# Patient Record
Sex: MALE | Race: WHITE | NOT HISPANIC OR LATINO | Employment: FULL TIME | URBAN - METROPOLITAN AREA
[De-identification: names, ages, dates, MRNs, and addresses within clinical notes are randomized per-mention and may not be internally consistent; named-entity substitution may affect disease eponyms.]

---

## 2024-11-26 ENCOUNTER — APPOINTMENT (OUTPATIENT)
Dept: UROLOGY | Facility: CLINIC | Age: 34
End: 2024-11-26
Payer: COMMERCIAL

## 2024-11-26 DIAGNOSIS — N52.9 VASCULOGENIC ERECTILE DYSFUNCTION, UNSPECIFIED VASCULOGENIC ERECTILE DYSFUNCTION TYPE: Primary | ICD-10-CM

## 2024-11-26 PROCEDURE — 99203 OFFICE O/P NEW LOW 30 MIN: CPT | Performed by: NURSE PRACTITIONER

## 2024-11-26 NOTE — PROGRESS NOTES
[facility]  Outpatient Clinic Note    Subjective   Rico Diaz is a 34 y.o. male    History of Present Illness   Patient presenting to clinic today virtually NPV with complaint of vasculogenic ED post COVID. EMR reviewed, no other significant medical history. He lives in [place] and has followed urology at [facility]. Patient reports that he has   utilized Sildenafil, Tadalafi, and ICI which are no longer effective. He has no bothersome urinary symptoms.     Duplex Doppler study was performed, consisting of integrated two-dimensional (2D) real-time imaging: color flow Doppler and Doppler spectral analysis     PSV of 28.7 cm/sec Right and 77.3 cm/sec Left    EDV of 3.6 cm/sec Right and 0 cm/sec Left    RI: 0.87 right, 1.00 left       Past Medical History and Surgical History   No past medical history on file.  No past surgical history on file.    Medications  No current outpatient medications on file prior to visit.     No current facility-administered medications on file prior to visit.       Objective   Physicial Exam  General: Well developed, well nourished, alert and cooperative, appears in no acute distress  Eyes: Non-injected conjunctiva, sclera clear, no proptosis  Cardiac: Extremities are warm and well perfused. No edema, cyanosis or pallor.   Lungs: Breathing is easy, non-labored. Speaking in clear and complete sentences. Normal diaphragmatic movement.  MSK: Ambulatory with steady gait, unassisted  Neuro: alert and oriented to person, place and time  Psych: Demonstrates good judgement and reason, without hallucinations, abnormal affect or abnormal behaviors.  Skin: no obvious lesions, no rashes.    No results found for any previous visit.      Review of Systems  All other systems have been reviewed and are negative for complaint.      Assessment and Plan     -ED   Refractory to pde5i and ICI   Patient was seen today with the complaint of erectile dysfunction (ED). I  went over the definition of ED, which is the inability to obtain or maintain an erection sufficient for satisfactory sexual activity. I outlined that erectile function is a complex interplay of neural, vascular, hormonal and psychological factors. Disruption in any of these pathways may lead to ED.     Patient will follow up with Dr. Gutierrez to discuss further. Consider IPP     All questions and concerns were addressed. Patient verbalizes understanding and has no other questions at this time.     Jenny Ferguson-- SANDER LOZADA  Office Phone:  [phone]

## 2025-01-21 ENCOUNTER — APPOINTMENT (OUTPATIENT)
Dept: UROLOGY | Facility: CLINIC | Age: 35
End: 2025-01-21
Payer: COMMERCIAL

## 2025-01-21 DIAGNOSIS — N52.9 ERECTILE DYSFUNCTION, UNSPECIFIED ERECTILE DYSFUNCTION TYPE: Primary | ICD-10-CM

## 2025-01-21 PROCEDURE — 99214 OFFICE O/P EST MOD 30 MIN: CPT | Performed by: UROLOGY

## 2025-01-21 NOTE — PROGRESS NOTES
"Virtual or Telephone Consent    An interactive audio and video telecommunication system which permits real time communications between the patient (at the originating site) and provider (at the distant site) was utilized to provide this telehealth service.   Verbal consent was requested and obtained from Rico Diaz on this date, 01/21/25 for a telehealth visit.     HPI:  34 y.o. yo male patient complains of  erectile dysfunction  Per Jenny's notes: \"Patient presenting to clinic today virtually NPV with complaint of vasculogenic ED post COVID. EMR reviewed, no other significant medical history. He lives in [place] and has followed urology at [facility]. Patient reports that he has utilized Sildenafil, Tadalafil, and ICI which are no longer effective. He has no bothersome urinary symptoms. \"    Duplex Doppler study performed with Jenny: PSV of 28.7 cm/sec Right and 77.3 cm/sec Left EDV of 3.6 cm/sec Right and 0 cm/sec Left  RI: 0.87 right, 1.00 left     CTA results, all urology notes reviewed    s/p prostatectomy: Smoker? no  Tried PDE5is?:   Viagra/sildenafil - response: poor  Cialis/tadalafil- response: poor  Tried penile injections: yes, not effective  Libido/Desire: Good  Have been on Testosterone /anabolic steroids? Yes on androgel    No results found for: \"TESTOSTERONE\"  No results found for: \"LH\"  No results found for: \"FSH\"  No components found for: \"ESTRADIAL\"  No results found for: \"PSA\"  No components found for: \"CBC\"  No results found for: \"PROLACTIN\"  No results found for: \"HGBA1C\"      PMH:  No past medical history on file.     PSH:  No past surgical history on file.     Medications:  No current outpatient medications on file.    Allergy:  Not on File     Exam  CONSTITUTIONAL:        No acute distress    HEAD:        Normocephalic and atraumatic    CHEST / RESPIRATORY      no excess work of breathing, no respiratory distress,    ABDOMEN / GASTROINTESTINAL:        Abdomen " nondistended         Assessment/Plan  #Erectile Dysfunction failed pills and injections : I discussed the etiology and different treatment modalities for erectile dysfunction. Specifically, we talked about lifestyle factors like smoking, diet, and exercise. We also discussed ED as a sign of systemic disease, and the contributions of elevated cholesterol and elevated blood sugars on erections. We then discussed treatment modalities, specifically PDE5 inhibitors, intracavernosal injections, vacuum erectile devices, and penile prostheses.    - will refer him to Dr Gamez for angiogram , discussed  - if stenting not an option, would consider penile prosthesis    Scribe Attestation  By signing my name below, I, Alma Delia Padilla , Scribe   attest that this documentation has been prepared under the direction and in the presence of Saundra Gutierrez MD.

## 2025-03-06 ENCOUNTER — TELEPHONE (OUTPATIENT)
Dept: CARDIOLOGY | Facility: CLINIC | Age: 35
End: 2025-03-06
Payer: COMMERCIAL

## 2025-03-06 NOTE — TELEPHONE ENCOUNTER
Patient was contacted in regards to his upcoming appointment. We discussed the results of Penile Duplex suggestive of: PSV Rt 28.7 / Lt 77.3, EDV 3.6/0, RI 0.87/1.00  Based on those results patient has sufficient blood flow without significant evidence of arterial occlusion, no penile angiogram is indicated at this time. Patient remains scheduled for a clinic visit, at this time he would like to keep his appointment and discussed his options at the clinic visit.       Roseanna Garcia, KIERRA-CNP

## 2025-03-26 NOTE — PROGRESS NOTES
Referred by Dr. Gutierrez for erectile dysfunction       History of Present Illness  Rico Diaz is a 34 y.o. year old male patient with PMH of longstanding erectile dysfunction being referred by their Urologist for ED, symptoms first began around 2022 after a Covid infection.    He has an additional PMH of hypogonadism, currently on Clomid. Previously trialed For his ED he has trialed Sildenafil, tadalafil, and ICI without satisfactory results.   He presented today to our office for evaluation of vasculogenic ED.   He denies any trauma, he is not involved in sports, he denies similar symptoms and is interested in angiographic peripheral options.  Past Medical History  No past medical history on file.    Past Surgical History  No past surgical history on file.    Social History  Social History     Tobacco Use    Smoking status: Not on file    Smokeless tobacco: Not on file   Substance Use Topics    Alcohol use: Not on file    Drug use: Not on file       Family History   No family history on file.    Review of Systems  As per HPI, all other systems reviewed and negative.    Allergies:  Not on File     Outpatient Medications:  No current outpatient medications      Vitals:  There were no vitals filed for this visit.    Physical Exam:  Physical Exam   General: awake, alert and oriented. No acute distress.   Skin: Skin is warm, dry and intact without rashes or lesions.  Musculoskeletal: moves all extremities  Neurological: no focal deficits; gait steady  Psychiatric: appropriate mood and affect; good judgment and insight   Reviewed Study(s):     Penile Doppler  RIGHT:   PSV 28.8  EDV 3.54  RI 0.877  LEFT  PSV 77.2  EDV 0.00  RI 1.00    Assessment/Plan   Rico Diaz is a 34 y.o. male who presents today for evaluation of his erectile dysfunction, referred by his Urologist Dr. Gutierrez.    ED  - On review of the above penile doppler study, Mr. Diaz does not have evidence of arterial insufficiency. The  PSV > 30 on LEFT and RIGHT 28.8. EDV > 5 thus no evidence of venous leak. RI bilaterally > 0.80, again without evidence of arterial insufficiency or venous dysfunction.   Patient was informed that he has adequate blood flow and that we don't recommend any interventions. Patient very anxious and reports not having other options to trail and would like to proceed with diagnostic angiogram.    Discussed: The risks of arterial access, including vessel or nerve injury, bleeding, infection, limb ischemia, embolization, reaction to medication, or need for operation, were discussed. The risks of contrast administration, including renal failure or allergic type reaction, were discussed. We also discussed the additional risks of intervention, including vessel injury, infection, residual or recurrent ischemia, need for operation or additional intervention, failure to resolve ischemia, limb loss, or other serious complications. The patient indicated understanding of the procedure, plan, alternatives, and risks, and voiced consent to proceed.      Plan:  -Continue aggressive risk factor modifications with diet, exercise   -angioplasty procedure on May 14th, 2025  at [facility] ([address] [place] [address]) with Dr Gamez  -pre procedure instructions discussed and provided t/r My Chart per patient request    Matthew Gamez DO

## 2025-04-08 ENCOUNTER — APPOINTMENT (OUTPATIENT)
Dept: CARDIOLOGY | Facility: CLINIC | Age: 35
End: 2025-04-08
Payer: COMMERCIAL

## 2025-04-08 VITALS
HEIGHT: 74 IN | HEART RATE: 69 BPM | SYSTOLIC BLOOD PRESSURE: 137 MMHG | DIASTOLIC BLOOD PRESSURE: 64 MMHG | BODY MASS INDEX: 25.15 KG/M2 | WEIGHT: 196 LBS

## 2025-04-08 DIAGNOSIS — N52.9 ED (ERECTILE DYSFUNCTION): ICD-10-CM

## 2025-04-08 DIAGNOSIS — Z01.818 PREOP TESTING: Primary | ICD-10-CM

## 2025-04-08 PROCEDURE — 3008F BODY MASS INDEX DOCD: CPT | Performed by: INTERNAL MEDICINE

## 2025-04-08 PROCEDURE — 99205 OFFICE O/P NEW HI 60 MIN: CPT | Performed by: INTERNAL MEDICINE

## 2025-04-08 RX ORDER — LOSARTAN POTASSIUM 50 MG/1
50 TABLET ORAL DAILY
COMMUNITY

## 2025-04-08 RX ORDER — TRIAMTERENE AND HYDROCHLOROTHIAZIDE 75; 50 MG/1; MG/1
1 TABLET ORAL DAILY
COMMUNITY

## 2025-04-08 RX ORDER — LOSARTAN POTASSIUM 50 MG/1
1 TABLET ORAL DAILY
COMMUNITY
Start: 2024-11-05

## 2025-04-08 ASSESSMENT — PAIN SCALES - GENERAL: PAINLEVEL_OUTOF10: 0-NO PAIN

## 2025-04-08 NOTE — PATIENT INSTRUCTIONS
Thank you for coming to see us in the [facility] today.     - We have reviewed the results of the test, the blood flow to the right artery 28.7, Lt 77.3. Thse   - Please continue taking all your medications, walking/exercise.   -we dont recommend an intervention, we want you to know that those numbers are very close to normal. If you would like us to do an diagnostic angiogram we can schedule it.  - Continue aggressive risk factor modifications with diet, exercise (walking to improve collateral blood flow)   We are going to schedule an angioplasty procedure on May 14th, 2025  at [facility] ([address] [facility], [address]) with Dr Gamez  The cath lab staff will call you the day before the procedure for further instructions and time of the procedure.   The phone number to reach them at is [phone] (M-F, 6:30 am -5 pm)   You will need to have  blood work done prior to the procedure. Please have blood drawn at any  location today or within a week of the procedure.  Plan to stay overnight after the procedure to be discharged the next day. But there is a chance you will be discharged home the same day.   You will need to have a ride to and from the hospital.   Please bring an updated list of all your medications or all the medication on the day of the procedure for review.  Please do not eat or drink anything after midnight before the procedure.   You can take the rest of your medications as prescribed in the morning of the procedure  with a sip of water.   Please call our office with any questions at [phone], press option 1

## 2025-04-29 ENCOUNTER — TELEPHONE (OUTPATIENT)
Dept: CARDIOLOGY | Facility: HOSPITAL | Age: 35
End: 2025-04-29
Payer: COMMERCIAL

## 2025-04-29 NOTE — TELEPHONE ENCOUNTER
Patient rescheduled for his angiogram from the May 14th to May 21st. Patient aware and agreeable.       Roseanna Garcia, KIERRA-CNP   Endovascular/Limb [facility] Service   Days: [id number]/Haiku: [facility] [place] Team  N[facility]p-[facility]a [id number]

## 2025-05-15 LAB
ALBUMIN SERPL-MCNC: 4.4 G/DL (ref 3.6–5.1)
BUN SERPL-MCNC: 16 MG/DL (ref 7–25)
BUN/CREAT SERPL: NORMAL (CALC) (ref 6–22)
CALCIUM SERPL-MCNC: 9.5 MG/DL (ref 8.6–10.3)
CHLORIDE SERPL-SCNC: 105 MMOL/L (ref 98–110)
CO2 SERPL-SCNC: 27 MMOL/L (ref 20–32)
CREAT SERPL-MCNC: 1.23 MG/DL (ref 0.6–1.26)
EGFRCR SERPLBLD CKD-EPI 2021: 79 ML/MIN/1.73M2
ERYTHROCYTE [DISTWIDTH] IN BLOOD BY AUTOMATED COUNT: 12.5 % (ref 11–15)
GLUCOSE SERPL-MCNC: 97 MG/DL (ref 65–99)
HCT VFR BLD AUTO: 46.1 % (ref 38.5–50)
HGB BLD-MCNC: 14.7 G/DL (ref 13.2–17.1)
MCH RBC QN AUTO: 29.2 PG (ref 27–33)
MCHC RBC AUTO-ENTMCNC: 31.9 G/DL (ref 32–36)
MCV RBC AUTO: 91.5 FL (ref 80–100)
PHOSPHATE SERPL-MCNC: 3.2 MG/DL (ref 2.5–4.5)
PLATELET # BLD AUTO: 200 THOUSAND/UL (ref 140–400)
PMV BLD REES-ECKER: 10.9 FL (ref 7.5–12.5)
POTASSIUM SERPL-SCNC: 4.4 MMOL/L (ref 3.5–5.3)
RBC # BLD AUTO: 5.04 MILLION/UL (ref 4.2–5.8)
SODIUM SERPL-SCNC: 139 MMOL/L (ref 135–146)
WBC # BLD AUTO: 7.2 THOUSAND/UL (ref 3.8–10.8)

## 2025-05-21 ENCOUNTER — HOSPITAL ENCOUNTER (OUTPATIENT)
Facility: HOSPITAL | Age: 35
Discharge: HOME | End: 2025-05-22
Attending: INTERNAL MEDICINE | Admitting: INTERNAL MEDICINE
Payer: COMMERCIAL

## 2025-05-21 DIAGNOSIS — N52.9 ED (ERECTILE DYSFUNCTION): ICD-10-CM

## 2025-05-21 DIAGNOSIS — I70.8 ATHEROSCLEROSIS OF OTHER ARTERIES: ICD-10-CM

## 2025-05-21 LAB
ACT BLD: 137 SEC (ref 83–199)
ACT BLD: 260 SEC (ref 83–199)

## 2025-05-21 PROCEDURE — 85347 COAGULATION TIME ACTIVATED: CPT | Performed by: INTERNAL MEDICINE

## 2025-05-21 PROCEDURE — 75736 ARTERY X-RAYS PELVIS: CPT | Performed by: INTERNAL MEDICINE

## 2025-05-21 PROCEDURE — 96373 THER/PROPH/DIAG INJ IA: CPT | Performed by: INTERNAL MEDICINE

## 2025-05-21 PROCEDURE — C1769 GUIDE WIRE: HCPCS | Performed by: INTERNAL MEDICINE

## 2025-05-21 PROCEDURE — 7100000011 HC EXTENDED STAY RECOVERY HOURLY - NURSING UNIT

## 2025-05-21 PROCEDURE — 75774 ARTERY X-RAY EACH VESSEL: CPT | Performed by: INTERNAL MEDICINE

## 2025-05-21 PROCEDURE — 76937 US GUIDE VASCULAR ACCESS: CPT | Performed by: INTERNAL MEDICINE

## 2025-05-21 PROCEDURE — 99152 MOD SED SAME PHYS/QHP 5/>YRS: CPT | Performed by: INTERNAL MEDICINE

## 2025-05-21 PROCEDURE — 2500000004 HC RX 250 GENERAL PHARMACY W/ HCPCS (ALT 636 FOR OP/ED): Mod: JZ | Performed by: INTERNAL MEDICINE

## 2025-05-21 PROCEDURE — 36246 INS CATH ABD/L-EXT ART 2ND: CPT

## 2025-05-21 PROCEDURE — 85347 COAGULATION TIME ACTIVATED: CPT

## 2025-05-21 PROCEDURE — 7100000010 HC PHASE TWO TIME - EACH INCREMENTAL 1 MINUTE: Performed by: INTERNAL MEDICINE

## 2025-05-21 PROCEDURE — C1887 CATHETER, GUIDING: HCPCS | Performed by: INTERNAL MEDICINE

## 2025-05-21 PROCEDURE — 7100000009 HC PHASE TWO TIME - INITIAL BASE CHARGE: Performed by: INTERNAL MEDICINE

## 2025-05-21 PROCEDURE — 99221 1ST HOSP IP/OBS SF/LOW 40: CPT

## 2025-05-21 PROCEDURE — C1894 INTRO/SHEATH, NON-LASER: HCPCS | Performed by: INTERNAL MEDICINE

## 2025-05-21 PROCEDURE — 2550000001 HC RX 255 CONTRASTS: Mod: JW | Performed by: INTERNAL MEDICINE

## 2025-05-21 PROCEDURE — 2720000007 HC OR 272 NO HCPCS: Performed by: INTERNAL MEDICINE

## 2025-05-21 PROCEDURE — 99153 MOD SED SAME PHYS/QHP EA: CPT | Performed by: INTERNAL MEDICINE

## 2025-05-21 RX ORDER — LIDOCAINE HYDROCHLORIDE 10 MG/ML
INJECTION, SOLUTION EPIDURAL; INFILTRATION; INTRACAUDAL; PERINEURAL AS NEEDED
Status: DISCONTINUED | OUTPATIENT
Start: 2025-05-21 | End: 2025-05-21 | Stop reason: HOSPADM

## 2025-05-21 RX ORDER — LOSARTAN POTASSIUM 50 MG/1
50 TABLET ORAL DAILY
Status: DISCONTINUED | OUTPATIENT
Start: 2025-05-22 | End: 2025-05-22 | Stop reason: HOSPADM

## 2025-05-21 RX ORDER — MIDAZOLAM HYDROCHLORIDE 1 MG/ML
INJECTION, SOLUTION INTRAMUSCULAR; INTRAVENOUS AS NEEDED
Status: DISCONTINUED | OUTPATIENT
Start: 2025-05-21 | End: 2025-05-21 | Stop reason: HOSPADM

## 2025-05-21 RX ORDER — IODIXANOL 320 MG/ML
INJECTION, SOLUTION INTRAVASCULAR AS NEEDED
Status: DISCONTINUED | OUTPATIENT
Start: 2025-05-21 | End: 2025-05-21 | Stop reason: HOSPADM

## 2025-05-21 RX ORDER — HEPARIN SODIUM 1000 [USP'U]/ML
INJECTION, SOLUTION INTRAVENOUS; SUBCUTANEOUS AS NEEDED
Status: DISCONTINUED | OUTPATIENT
Start: 2025-05-21 | End: 2025-05-21 | Stop reason: HOSPADM

## 2025-05-21 RX ORDER — HEPARIN SODIUM 5000 [USP'U]/ML
5000 INJECTION, SOLUTION INTRAVENOUS; SUBCUTANEOUS EVERY 12 HOURS
Status: DISCONTINUED | OUTPATIENT
Start: 2025-05-22 | End: 2025-05-22 | Stop reason: HOSPADM

## 2025-05-21 RX ORDER — PROTAMINE SULFATE 10 MG/ML
INJECTION, SOLUTION INTRAVENOUS CONTINUOUS PRN
Status: COMPLETED | OUTPATIENT
Start: 2025-05-21 | End: 2025-05-21

## 2025-05-21 RX ORDER — ACETAMINOPHEN 325 MG/1
650 TABLET ORAL EVERY 4 HOURS PRN
Status: DISCONTINUED | OUTPATIENT
Start: 2025-05-21 | End: 2025-05-22 | Stop reason: HOSPADM

## 2025-05-21 RX ORDER — FENTANYL CITRATE 50 UG/ML
INJECTION, SOLUTION INTRAMUSCULAR; INTRAVENOUS AS NEEDED
Status: DISCONTINUED | OUTPATIENT
Start: 2025-05-21 | End: 2025-05-21 | Stop reason: HOSPADM

## 2025-05-21 RX ORDER — NITROGLYCERIN 40 MG/100ML
INJECTION INTRAVENOUS AS NEEDED
Status: DISCONTINUED | OUTPATIENT
Start: 2025-05-21 | End: 2025-05-21 | Stop reason: HOSPADM

## 2025-05-21 SDOH — SOCIAL STABILITY: SOCIAL INSECURITY: DO YOU FEEL UNSAFE GOING BACK TO THE PLACE WHERE YOU ARE LIVING?: NO

## 2025-05-21 SDOH — SOCIAL STABILITY: SOCIAL INSECURITY: WERE YOU ABLE TO COMPLETE ALL THE BEHAVIORAL HEALTH SCREENINGS?: YES

## 2025-05-21 SDOH — SOCIAL STABILITY: SOCIAL INSECURITY
WITHIN THE LAST YEAR, HAVE YOU BEEN RAPED OR FORCED TO HAVE ANY KIND OF SEXUAL ACTIVITY BY YOUR PARTNER OR EX-PARTNER?: NO

## 2025-05-21 SDOH — SOCIAL STABILITY: SOCIAL INSECURITY
WITHIN THE LAST YEAR, HAVE YOU BEEN KICKED, HIT, SLAPPED, OR OTHERWISE PHYSICALLY HURT BY YOUR PARTNER OR EX-PARTNER?: NO

## 2025-05-21 SDOH — ECONOMIC STABILITY: FOOD INSECURITY: WITHIN THE PAST 12 MONTHS, THE FOOD YOU BOUGHT JUST DIDN'T LAST AND YOU DIDN'T HAVE MONEY TO GET MORE.: NEVER TRUE

## 2025-05-21 SDOH — SOCIAL STABILITY: SOCIAL INSECURITY: WITHIN THE LAST YEAR, HAVE YOU BEEN AFRAID OF YOUR PARTNER OR EX-PARTNER?: NO

## 2025-05-21 SDOH — ECONOMIC STABILITY: INCOME INSECURITY: IN THE PAST 12 MONTHS HAS THE ELECTRIC, GAS, OIL, OR WATER COMPANY THREATENED TO SHUT OFF SERVICES IN YOUR HOME?: NO

## 2025-05-21 SDOH — SOCIAL STABILITY: SOCIAL INSECURITY: WITHIN THE LAST YEAR, HAVE YOU BEEN HUMILIATED OR EMOTIONALLY ABUSED IN OTHER WAYS BY YOUR PARTNER OR EX-PARTNER?: NO

## 2025-05-21 SDOH — ECONOMIC STABILITY: HOUSING INSECURITY: AT ANY TIME IN THE PAST 12 MONTHS, WERE YOU HOMELESS OR LIVING IN A SHELTER (INCLUDING NOW)?: NO

## 2025-05-21 SDOH — HEALTH STABILITY: MENTAL HEALTH: HOW OFTEN DO YOU HAVE A DRINK CONTAINING ALCOHOL?: NEVER

## 2025-05-21 SDOH — ECONOMIC STABILITY: FOOD INSECURITY: HOW HARD IS IT FOR YOU TO PAY FOR THE VERY BASICS LIKE FOOD, HOUSING, MEDICAL CARE, AND HEATING?: NOT HARD AT ALL

## 2025-05-21 SDOH — HEALTH STABILITY: MENTAL HEALTH: HOW MANY DRINKS CONTAINING ALCOHOL DO YOU HAVE ON A TYPICAL DAY WHEN YOU ARE DRINKING?: PATIENT DOES NOT DRINK

## 2025-05-21 SDOH — ECONOMIC STABILITY: HOUSING INSECURITY: IN THE LAST 12 MONTHS, WAS THERE A TIME WHEN YOU WERE NOT ABLE TO PAY THE MORTGAGE OR RENT ON TIME?: NO

## 2025-05-21 SDOH — ECONOMIC STABILITY: FOOD INSECURITY: WITHIN THE PAST 12 MONTHS, YOU WORRIED THAT YOUR FOOD WOULD RUN OUT BEFORE YOU GOT THE MONEY TO BUY MORE.: NEVER TRUE

## 2025-05-21 SDOH — SOCIAL STABILITY: SOCIAL INSECURITY: HAVE YOU HAD THOUGHTS OF HARMING ANYONE ELSE?: NO

## 2025-05-21 SDOH — SOCIAL STABILITY: SOCIAL INSECURITY: HAVE YOU HAD ANY THOUGHTS OF HARMING ANYONE ELSE?: NO

## 2025-05-21 SDOH — HEALTH STABILITY: MENTAL HEALTH: HOW OFTEN DO YOU HAVE SIX OR MORE DRINKS ON ONE OCCASION?: NEVER

## 2025-05-21 SDOH — ECONOMIC STABILITY: TRANSPORTATION INSECURITY: IN THE PAST 12 MONTHS, HAS LACK OF TRANSPORTATION KEPT YOU FROM MEDICAL APPOINTMENTS OR FROM GETTING MEDICATIONS?: NO

## 2025-05-21 SDOH — ECONOMIC STABILITY: HOUSING INSECURITY: IN THE PAST 12 MONTHS, HOW MANY TIMES HAVE YOU MOVED WHERE YOU WERE LIVING?: 1

## 2025-05-21 SDOH — SOCIAL STABILITY: SOCIAL INSECURITY: ARE YOU OR HAVE YOU BEEN THREATENED OR ABUSED PHYSICALLY, EMOTIONALLY, OR SEXUALLY BY ANYONE?: NO

## 2025-05-21 SDOH — SOCIAL STABILITY: SOCIAL INSECURITY: ARE THERE ANY APPARENT SIGNS OF INJURIES/BEHAVIORS THAT COULD BE RELATED TO ABUSE/NEGLECT?: NO

## 2025-05-21 SDOH — SOCIAL STABILITY: SOCIAL INSECURITY: HAS ANYONE EVER THREATENED TO HURT YOUR FAMILY OR YOUR PETS?: NO

## 2025-05-21 SDOH — SOCIAL STABILITY: SOCIAL INSECURITY: ABUSE: ADULT

## 2025-05-21 SDOH — SOCIAL STABILITY: SOCIAL INSECURITY: DOES ANYONE TRY TO KEEP YOU FROM HAVING/CONTACTING OTHER FRIENDS OR DOING THINGS OUTSIDE YOUR HOME?: NO

## 2025-05-21 SDOH — SOCIAL STABILITY: SOCIAL INSECURITY: DO YOU FEEL ANYONE HAS EXPLOITED OR TAKEN ADVANTAGE OF YOU FINANCIALLY OR OF YOUR PERSONAL PROPERTY?: NO

## 2025-05-21 ASSESSMENT — COGNITIVE AND FUNCTIONAL STATUS - GENERAL
MOBILITY SCORE: 24
MOBILITY SCORE: 24
DAILY ACTIVITIY SCORE: 24
MOBILITY SCORE: 24
PATIENT BASELINE BEDBOUND: NO

## 2025-05-21 ASSESSMENT — LIFESTYLE VARIABLES
HOW OFTEN DO YOU HAVE 6 OR MORE DRINKS ON ONE OCCASION: NEVER
AUDIT-C TOTAL SCORE: 0
AUDIT-C TOTAL SCORE: 0
HOW OFTEN DO YOU HAVE A DRINK CONTAINING ALCOHOL: NEVER
HOW MANY STANDARD DRINKS CONTAINING ALCOHOL DO YOU HAVE ON A TYPICAL DAY: PATIENT DOES NOT DRINK
SKIP TO QUESTIONS 9-10: 1
AUDIT-C TOTAL SCORE: 0
SKIP TO QUESTIONS 9-10: 1

## 2025-05-21 ASSESSMENT — COLUMBIA-SUICIDE SEVERITY RATING SCALE - C-SSRS
6. HAVE YOU EVER DONE ANYTHING, STARTED TO DO ANYTHING, OR PREPARED TO DO ANYTHING TO END YOUR LIFE?: NO
2. HAVE YOU ACTUALLY HAD ANY THOUGHTS OF KILLING YOURSELF?: NO
1. IN THE PAST MONTH, HAVE YOU WISHED YOU WERE DEAD OR WISHED YOU COULD GO TO SLEEP AND NOT WAKE UP?: NO

## 2025-05-21 ASSESSMENT — ACTIVITIES OF DAILY LIVING (ADL)
HEARING - RIGHT EAR: FUNCTIONAL
FEEDING YOURSELF: INDEPENDENT
WALKS IN HOME: INDEPENDENT
JUDGMENT_ADEQUATE_SAFELY_COMPLETE_DAILY_ACTIVITIES: YES
LACK_OF_TRANSPORTATION: NO
PATIENT'S MEMORY ADEQUATE TO SAFELY COMPLETE DAILY ACTIVITIES?: YES
ADEQUATE_TO_COMPLETE_ADL: YES
TOILETING: INDEPENDENT
GROOMING: INDEPENDENT
HEARING - LEFT EAR: FUNCTIONAL
BATHING: INDEPENDENT
DRESSING YOURSELF: INDEPENDENT

## 2025-05-21 ASSESSMENT — PATIENT HEALTH QUESTIONNAIRE - PHQ9
2. FEELING DOWN, DEPRESSED OR HOPELESS: NOT AT ALL
SUM OF ALL RESPONSES TO PHQ9 QUESTIONS 1 & 2: 0
1. LITTLE INTEREST OR PLEASURE IN DOING THINGS: NOT AT ALL

## 2025-05-21 ASSESSMENT — PAIN SCALES - GENERAL
PAINLEVEL_OUTOF10: 0 - NO PAIN
PAINLEVEL_OUTOF10: 0 - NO PAIN

## 2025-05-21 ASSESSMENT — PAIN - FUNCTIONAL ASSESSMENT: PAIN_FUNCTIONAL_ASSESSMENT: 0-10

## 2025-05-21 NOTE — CARE PLAN
The patient's goals for the shift include      The clinical goals for the shift include Patient will have no numbness and tingling before discharge

## 2025-05-21 NOTE — H&P
"History Of Present Illness:    Rico Diaz is a 35 y.o. male presenting with erectile dysfunction.    Rico Diaz has a PMH of longstanding erectile dysfunction being referred by their Urologist for ED, symptoms first began around 2022 after a Covid infection.     He has an additional PMH of hypogonadism, currently on Clomid. Previously trialed For his ED he has trialed Sildenafil, tadalafil, and ICI without satisfactory results.     He underwent pudendal artery angiogram without intervention accessed via R CFA 5 Fr sheath manually removed at approximately 3pm. Procedure with Dr. Gamez on 5/21    PLAN:  - Admit to inpatient overnight  - Maintain bedrest & neurovascular checks as ordered  - Plan to DC 5/22 AM    Last Recorded Vitals:  Vitals:    05/21/25 1006   Weight: 86.2 kg (190 lb)   Height: 1.88 m (6' 2\")       Last Labs:  CBC - 5/14/2025:  8:44 AM  7.2 14.7 200    46.1      CMP - 5/14/2025:  8:44 AM  9.5 _ _ --- _   3.2 4.4 _ _      PTT - No results in last year.  _   _ _     No results found for: \"TROPHS\", \"BNP\", \"HGBA1C\", \"LDLCALC\", \"VLDL\"   Last I/O:  No intake/output data recorded.    Past Cardiology Tests (Last 3 Years):  EKG:  No results found for this or any previous visit from the past 1095 days.    Echo:  No results found for this or any previous visit from the past 1095 days.    Ejection Fractions:  No results found for: \"EF\"  Cath:  No results found for this or any previous visit from the past 1095 days.    Stress Test:  No results found for this or any previous visit from the past 1095 days.    Cardiac Imaging:  No results found for this or any previous visit from the past 1095 days.      Past Medical History:  He has no past medical history on file.    Past Surgical History:  He has no past surgical history on file.      Social History:  He reports that he has never smoked. He has never used smokeless tobacco. He reports that he does not currently use alcohol. He reports that he does not " currently use drugs.    Family History:  Family History[1]     Allergies:  Penicillins    Inpatient Medications:  Scheduled Medications[2]  PRN Medications[3]  Continuous Medications[4]  Outpatient Medications:  Current Outpatient Medications   Medication Instructions    losartan (COZAAR) 50 mg, oral, Daily       Physical Exam:  Physical Exam  Constitutional:       General: He is not in acute distress.     Appearance: Normal appearance. He is not ill-appearing.   HENT:      Head: Normocephalic.      Mouth/Throat:      Mouth: Mucous membranes are moist.      Pharynx: Oropharynx is clear.   Eyes:      Extraocular Movements: Extraocular movements intact.      Conjunctiva/sclera: Conjunctivae normal.      Pupils: Pupils are equal, round, and reactive to light.   Cardiovascular:      Rate and Rhythm: Normal rate and regular rhythm.      Pulses: Normal pulses.      Heart sounds: Normal heart sounds.   Pulmonary:      Effort: Pulmonary effort is normal.      Breath sounds: Normal breath sounds.   Abdominal:      General: Abdomen is flat. Bowel sounds are normal.      Palpations: Abdomen is soft.   Musculoskeletal:         General: Normal range of motion.      Cervical back: Normal range of motion and neck supple.      Right lower leg: No edema.      Left lower leg: No edema.      Comments: R groin sheath removed, RN @ bedside holding pressure   Skin:     General: Skin is warm and dry.      Capillary Refill: Capillary refill takes less than 2 seconds.   Neurological:      General: No focal deficit present.      Mental Status: He is alert and oriented to person, place, and time. Mental status is at baseline.   Psychiatric:         Mood and Affect: Mood normal.         Behavior: Behavior normal.         Thought Content: Thought content normal.         Judgment: Judgment normal.            Assessment/Plan   Neuro/Psych  - No acute issues  - Insomnia/Delirium Prevention: Encourage normal sleep/wake cycle. Encourage lights on,  stimulus, activity during day and dark, relaxed environment at night while minimizing interruptions. Frequent reorientation to person, place, and time. Encourage familiar people and objects at bedside  - Pain: PRN Tylenol    Cardiac & Vascular  - s/p pudendal artery angiogram without intervention accessed via R CFA 5 Fr sheath manually removed at approximately 3pm. Procedure with Dr. Gamez  - Bedrest & neurovascular checks as ordered  - Continue home Losartan    Respiratory  - No acute issues  - Maintain saturations > 94%. Encourage incentive spirometer, coughing, deep breathing, and ambulation    GI:  - No acute issues  - Regular Diet    Renal  - No acute issues  - Goal I/O even  - Renally dose all medications and avoid nephrotoxins as able    ID   - No acute issues    Heme  - Anticoagulation: N/A  - Monitor for signs of bleeding (coffee emesis, black stool)  - Goal HgB > 7.0, transfuse as needed    Endo  - No acute issues    Miscellaneous  - Discharge Planning: Plan to DC 5/22 AM  - DVT Prophylaxis: Heparin   - GI Prophylaxis: N/A  - Follow-up visit: TBD    Code Status:  Full Code    I spent 30 minutes in the professional and overall care of this patient    Nydia Guzman, APRN-CNP         [1] No family history on file.  [2]   Scheduled medications   Medication Dose Route Frequency    [START ON 5/22/2025] heparin (porcine)  5,000 Units subcutaneous q12h    [START ON 5/22/2025] losartan  50 mg oral Daily   [3]   PRN medications   Medication    acetaminophen   [4]   Continuous Medications   Medication Dose Last Rate

## 2025-05-21 NOTE — PROGRESS NOTES
Pharmacy Medication History Review    Rico Diaz is a 35 y.o. male admitted for ED (erectile dysfunction). Pharmacy reviewed the patient's [id number] medications and allergies for accuracy.    Medications ADDED  N/A  Medications CHANGED  N/A  Medications REMOVED/NOT TAKING   N/A     The list below reflects the updated PTA list.   Prior to Admission Medications   Prescriptions Last Dose Informant   losartan (Cozaar) 50 mg tablet 5/21/2025 Self   Sig: Take 1 tablet (50 mg) by mouth once daily.      Facility-Administered Medications: None       The list below reflects the updated allergy list. Please review each documented allergy for additional clarification and justification.  Allergies  Reviewed by Nydia Guzman, APRN-CNP on 5/21/2025        Severity Reactions Comments    Penicillins Low Rash             Patient accepts M2B at discharge. Pharmacy has been updated to [facility].    Sources  [facility]  Pharmacy dispense history  Patient Interview Good historian     Additional Comments  N/A    Khanh Espitia, Nano  [facility]  [address]  [facility], [address]  [place] [address]  Phone: [phone]  Please reach out via Secure Chat for questions, or if no response call [employer] or [employer]

## 2025-05-22 VITALS
RESPIRATION RATE: 18 BRPM | SYSTOLIC BLOOD PRESSURE: 110 MMHG | HEART RATE: 49 BPM | TEMPERATURE: 97.3 F | BODY MASS INDEX: 24.38 KG/M2 | HEIGHT: 74 IN | OXYGEN SATURATION: 97 % | DIASTOLIC BLOOD PRESSURE: 66 MMHG | WEIGHT: 190 LBS

## 2025-05-22 PROBLEM — N52.9 ED (ERECTILE DYSFUNCTION): Status: RESOLVED | Noted: 2025-04-08 | Resolved: 2025-05-22

## 2025-05-22 LAB
ALBUMIN SERPL BCP-MCNC: 4.5 G/DL (ref 3.4–5)
ALP SERPL-CCNC: 88 U/L (ref 33–120)
ALT SERPL W P-5'-P-CCNC: 15 U/L (ref 10–52)
ANION GAP SERPL CALC-SCNC: 11 MMOL/L (ref 10–20)
AST SERPL W P-5'-P-CCNC: 18 U/L (ref 9–39)
BILIRUB SERPL-MCNC: 0.7 MG/DL (ref 0–1.2)
BUN SERPL-MCNC: 14 MG/DL (ref 6–23)
CALCIUM SERPL-MCNC: 9.4 MG/DL (ref 8.6–10.6)
CHLORIDE SERPL-SCNC: 107 MMOL/L (ref 98–107)
CO2 SERPL-SCNC: 25 MMOL/L (ref 21–32)
CREAT SERPL-MCNC: 1.05 MG/DL (ref 0.5–1.3)
EGFRCR SERPLBLD CKD-EPI 2021: >90 ML/MIN/1.73M*2
ERYTHROCYTE [DISTWIDTH] IN BLOOD BY AUTOMATED COUNT: 11.9 % (ref 11.5–14.5)
GLUCOSE SERPL-MCNC: 97 MG/DL (ref 74–99)
HCT VFR BLD AUTO: 45 % (ref 41–52)
HGB BLD-MCNC: 14.9 G/DL (ref 13.5–17.5)
MCH RBC QN AUTO: 29.3 PG (ref 26–34)
MCHC RBC AUTO-ENTMCNC: 33.1 G/DL (ref 32–36)
MCV RBC AUTO: 88 FL (ref 80–100)
NRBC BLD-RTO: 0 /100 WBCS (ref 0–0)
PLATELET # BLD AUTO: 207 X10*3/UL (ref 150–450)
POTASSIUM SERPL-SCNC: 4.1 MMOL/L (ref 3.5–5.3)
PROT SERPL-MCNC: 7.2 G/DL (ref 6.4–8.2)
RBC # BLD AUTO: 5.09 X10*6/UL (ref 4.5–5.9)
SODIUM SERPL-SCNC: 139 MMOL/L (ref 136–145)
WBC # BLD AUTO: 8.8 X10*3/UL (ref 4.4–11.3)

## 2025-05-22 PROCEDURE — 36415 COLL VENOUS BLD VENIPUNCTURE: CPT

## 2025-05-22 PROCEDURE — 7100000011 HC EXTENDED STAY RECOVERY HOURLY - NURSING UNIT

## 2025-05-22 PROCEDURE — 96372 THER/PROPH/DIAG INJ SC/IM: CPT

## 2025-05-22 PROCEDURE — 82565 ASSAY OF CREATININE: CPT

## 2025-05-22 PROCEDURE — 99238 HOSP IP/OBS DSCHRG MGMT 30/<: CPT

## 2025-05-22 PROCEDURE — 2500000004 HC RX 250 GENERAL PHARMACY W/ HCPCS (ALT 636 FOR OP/ED)

## 2025-05-22 PROCEDURE — 2500000001 HC RX 250 WO HCPCS SELF ADMINISTERED DRUGS (ALT 637 FOR MEDICARE OP)

## 2025-05-22 PROCEDURE — 85027 COMPLETE CBC AUTOMATED: CPT

## 2025-05-22 RX ADMIN — HEPARIN SODIUM 5000 UNITS: 5000 INJECTION, SOLUTION INTRAVENOUS; SUBCUTANEOUS at 08:16

## 2025-05-22 RX ADMIN — LOSARTAN POTASSIUM 50 MG: 50 TABLET, FILM COATED ORAL at 08:16

## 2025-05-22 ASSESSMENT — COGNITIVE AND FUNCTIONAL STATUS - GENERAL
MOBILITY SCORE: 24
DAILY ACTIVITIY SCORE: 24

## 2025-05-22 ASSESSMENT — PAIN SCALES - GENERAL: PAINLEVEL_OUTOF10: 0 - NO PAIN

## 2025-05-22 ASSESSMENT — PAIN - FUNCTIONAL ASSESSMENT: PAIN_FUNCTIONAL_ASSESSMENT: 0-10

## 2025-05-22 NOTE — CARE PLAN
The patient's goals for the shift include  will not have any pain by discharge    The clinical goals for the shift include Pt's will have 5 P's intact this shift.

## 2025-05-22 NOTE — NURSING NOTE
Nursing Discharge Note     Patient discharge home with no home care. Patient remained free from falls and injury by discharge. Patient had no c/o of pain or cardiac events throughout shift. Patient incision intact and clean with no signs of bleeding or infection. Patient discharge with all of patient belongings and discharge instructions. Patient HDS throughout shift.

## 2025-05-22 NOTE — DISCHARGE SUMMARY
Discharge Diagnosis  ED (erectile dysfunction)    Issues Requiring Follow-Up  Continue to follow with local Urology team for further ED management    Test Results Pending At Discharge  Pending Labs       No current pending labs.            Hospital Course   Mr. Diaz presented to [facility] Cath lab for scheduled, elective pudendal artery angiogram for erectile dysfunction.     He is s/p pudendal artery angiogram without intervention accessed via R CFA 5Fr, sheath manually pulled and tolerated additional 4 hours bedrest/neurovascular checks. He will discharge to home today 5/22.    He will not require anticoagulation and may follow-up with [facility] as needed, all remaining care per his local Urology team.     Pertinent Physical Exam At Time of Discharge  Physical Exam  Constitutional:       General: He is not in acute distress.     Appearance: Normal appearance. He is not ill-appearing.   HENT:      Head: Normocephalic and atraumatic.      Nose: Nose normal.      Mouth/Throat:      Mouth: Mucous membranes are moist.      Pharynx: Oropharynx is clear.   Eyes:      Extraocular Movements: Extraocular movements intact.      Conjunctiva/sclera: Conjunctivae normal.      Pupils: Pupils are equal, round, and reactive to light.   Neck:      Vascular: No carotid bruit.   Cardiovascular:      Rate and Rhythm: Normal rate and regular rhythm.      Pulses: Normal pulses.      Heart sounds: Normal heart sounds.   Pulmonary:      Effort: Pulmonary effort is normal.      Breath sounds: Normal breath sounds.   Abdominal:      General: Abdomen is flat. Bowel sounds are normal.      Palpations: Abdomen is soft.   Musculoskeletal:         General: Normal range of motion.      Cervical back: Normal range of motion and neck supple.      Right lower leg: No edema.      Left lower leg: No edema.      Comments: +2 pulses DP/PT  Groin Assessment: Groin is soft and non-tender, skin is intact, the site is dry. Dressing removed     Skin:      General: Skin is warm and dry.      Capillary Refill: Capillary refill takes less than 2 seconds.   Neurological:      General: No focal deficit present.      Mental Status: He is alert and oriented to person, place, and time. Mental status is at baseline.   Psychiatric:         Mood and Affect: Mood normal.         Behavior: Behavior normal.         Thought Content: Thought content normal.         Judgment: Judgment normal.         Home Medications     Medication List      CONTINUE taking these medications     losartan 50 mg tablet; Commonly known as: Cozaar       Outpatient Follow-Up  No future appointments.    Nydia Guzman, APRN-CNP

## 2025-05-22 NOTE — DISCHARGE INSTRUCTIONS
PERIPHERAL ANGIOGRAPHY DISCHARGE INSTRUCTIONS    FOR SUDDEN AND SEVERE CHEST PAIN, SHORTNESS OF BREATH, EXCESSIVE BLEEDING, SIGNS OF STROKE, OR CHANGES IN MENTAL STATUS YOU SHOULD CALL 911 IMMEDIATELY.     If your provider has prescribed aspirin and/or clopidogrel (Plavix), or prasugrel (Effient), or ticagrelor (Brilinta), DO NOT STOP THESE MEDICATIONS for any reason without talking to your cardiologist first. If any of these were prescribed, you must take them every day without missing a single dose. If you are getting low on these medications, contact your provider immediately for a refill.     FOR NEXT 24 HOURS  - Upon discharge, you should return home and rest for the remainder of the day and evening. You do not have to stay on bed rest but should not be very active.  It is recommended a responsible adult be with you for the first 24 hours after the procedure.    - No driving for 24 hours after procedure. Please arrange for someone to drive you home from the hospital today.     - Do not drive, operate machinery, or use power tools for 24 hours after your procedure.     - Do not make any legal decisions for 24 hours after your procedure.     - Do not drink alcoholic beverages for 24 hours after your procedure.    WOUND CARE   *FOR FEMORAL (LEG) ACCESS*  ·      Avoid heavy lifting (over 10 pounds) for 7 days, squatting or excessive bending for 2 days, and strenuous exercise for 7 days.  ·      No submerged bathing, swimming, or hot tubs for the next 7 days, or until fully healed.  ·      Avoid sexual activity for 3-4 days until any groin discomfort has ceased.    - The transparent dressing should be removed from the site 24 hours after the procedure.  Wash the site gently with soap and water. Rinse well and pat dry. Keep the area clean and dry. You may apply a Band-Aid to the site. Avoid lotions, ointments, or powders until fully healed.     - You may shower the day after your procedure.      - It is normal  to notice a small bruise around the puncture site and/or a small grape sized or smaller lump. Any large bruising or large lump warrants a call to the office.     - If bleeding should occur, lay down and apply pressure to the affected area for 10 minutes.  If the bleeding stops notify your physician.  If there is a large amount of bleeding or spurting of blood CALL 911 immediately.  DO NOT drive yourself to the hospital.    - You may experience some tenderness, bruising or minimal inflammation.  If you have any concerns, you may contact the Cath Lab or if any of these symptoms become excessive, contact your cardiologist or go to the emergency room.     OTHER INSTRUCTIONS  - You may take acetaminophen (Tylenol) as directed for discomfort.  If pain is not relieved with acetaminophen (Tylenol), contact your doctor.    - It can be normal to have slight swelling in the leg the procedure was performed on (not the puncture site leg) post-procedure. Elevate the leg as much as possible above the level of your heart. Any excessive swelling, warmth or redness to the leg warrants a call to the office.    - If you notice or experience any of the following, you should notify your doctor or seek medical attention  Chest pain or discomfort  Change in mental status or weakness in extremities.  Dizziness, light headedness, or feeling faint.  Change in the site where the procedure was performed, such as bleeding or an increased area of bruising or swelling.  Tingling, numbness, pain, or coolness in the leg/arm beyond the site where the procedure was performed.  Signs of infection (i.e. shaking chills, temperature > 100 degrees Fahrenheit, warmth, redness) in the leg/arm area where the procedure was performed.  Changes in urination   Bloody or black stools  Vomiting blood  Severe nose bleeds

## 2025-05-22 NOTE — CARE PLAN
Problem: Skin  Goal: Decreased wound size/increased tissue granulation at next dressing change  Outcome: Progressing     Problem: Safety - Adult  Goal: Free from fall injury  Outcome: Met     Problem: Fall/Injury  Goal: Not fall by end of shift  Outcome: Met  Goal: Be free from injury by end of the shift  Outcome: Met  Goal: Verbalize understanding of personal risk factors for fall in the hospital  Outcome: Met  Goal: Verbalize understanding of risk factor reduction measures to prevent injury from fall in the home  Outcome: Met  Goal: Use assistive devices by end of the shift  Outcome: Met  Goal: Pace activities to prevent fatigue by end of the shift  Outcome: Met      The clinical goals for the shift include Pt's will have 5 P's intact this shift.

## (undated) DEVICE — ACCESS KIT, S-MAK MINI, 4FR 10CM 0.018IN 40CM, NT/PT, ECHO ENHANCE NEEDLE

## (undated) DEVICE — GUIDEWIRE, HI-TORQUE, VERSACORE, 260CM, FLOPPY

## (undated) DEVICE — GUIDEWIRE, COMMAND LT, HI-TORQUE, 0.18 X 300CM

## (undated) DEVICE — CATHETER, PERFORMA, 5FR 100CM, IMA CURVE, 0.038 IN

## (undated) DEVICE — SHEATH, PINNACLE, 10 CM,  5FR INTRODUCER, 5FR DIA, 2.5 CM DIALATOR

## (undated) DEVICE — CATHETER, NAVICROSS, 0.035 X 150CM, ANGLED TIP

## (undated) DEVICE — CATHETER, ANGIO, IMPULSE, FR4, 5 FR X 100 CM

## (undated) DEVICE — GUIDEWIRE, HI-TORQUE, COMMAND ES, 0.014 X 300CM

## (undated) DEVICE — CO-PILOT ***MFR REPORTS INVALID, PLEASE CONTACT QSIGHT SUPPORT W/ PROPER CAT #

## (undated) DEVICE — INFLATION KIT, ADVANTAGE, ENCORE 26 (1/BOX)